# Patient Record
Sex: FEMALE | Race: WHITE | NOT HISPANIC OR LATINO | URBAN - METROPOLITAN AREA
[De-identification: names, ages, dates, MRNs, and addresses within clinical notes are randomized per-mention and may not be internally consistent; named-entity substitution may affect disease eponyms.]

---

## 2018-01-07 LAB — RAPID GROUP A STREP (OHS): NEGATIVE

## 2020-02-25 LAB — RAPID GROUP A STREP (OHS): NEGATIVE

## 2020-10-06 ENCOUNTER — HISTORICAL (OUTPATIENT)
Dept: URGENT CARE | Facility: CLINIC | Age: 65
End: 2020-10-06

## 2020-10-06 ENCOUNTER — HISTORICAL (OUTPATIENT)
Dept: ADMINISTRATIVE | Facility: HOSPITAL | Age: 65
End: 2020-10-06

## 2020-10-06 LAB
ABS NEUT (OLG): 4.35 X10(3)/MCL (ref 2.1–9.2)
BASOPHILS # BLD AUTO: 0 X10(3)/MCL (ref 0–0.2)
BASOPHILS NFR BLD AUTO: 0 %
EOSINOPHIL # BLD AUTO: 0.1 X10(3)/MCL (ref 0–0.9)
EOSINOPHIL NFR BLD AUTO: 1 %
ERYTHROCYTE [DISTWIDTH] IN BLOOD BY AUTOMATED COUNT: 12.4 % (ref 11.5–17)
HCT VFR BLD AUTO: 41.1 % (ref 37–47)
HGB BLD-MCNC: 13.4 GM/DL (ref 12–16)
LYMPHOCYTES # BLD AUTO: 2 X10(3)/MCL (ref 0.6–4.6)
LYMPHOCYTES NFR BLD AUTO: 29 %
MCH RBC QN AUTO: 32.7 PG (ref 27–31)
MCHC RBC AUTO-ENTMCNC: 32.6 GM/DL (ref 33–36)
MCV RBC AUTO: 100.2 FL (ref 80–94)
MONOCYTES # BLD AUTO: 0.4 X10(3)/MCL (ref 0.1–1.3)
MONOCYTES NFR BLD AUTO: 6 %
NEUTROPHILS # BLD AUTO: 4.35 X10(3)/MCL (ref 2.1–9.2)
NEUTROPHILS NFR BLD AUTO: 63 %
PLATELET # BLD AUTO: 336 X10(3)/MCL (ref 130–400)
PMV BLD AUTO: 9.5 FL (ref 9.4–12.4)
RBC # BLD AUTO: 4.1 X10(6)/MCL (ref 4.2–5.4)
URATE SERPL-MCNC: 5 MG/DL (ref 2.7–6)
WBC # SPEC AUTO: 6.9 X10(3)/MCL (ref 4.5–11.5)

## 2022-04-10 ENCOUNTER — HISTORICAL (OUTPATIENT)
Dept: ADMINISTRATIVE | Facility: HOSPITAL | Age: 67
End: 2022-04-10

## 2022-04-29 VITALS
DIASTOLIC BLOOD PRESSURE: 76 MMHG | BODY MASS INDEX: 30.79 KG/M2 | OXYGEN SATURATION: 96 % | WEIGHT: 173.75 LBS | HEIGHT: 63 IN | SYSTOLIC BLOOD PRESSURE: 127 MMHG

## 2022-05-04 NOTE — HISTORICAL [facility]ER
This is a historical note converted from [employer]. Formatting and pictures may have been removed.  Please reference [employer] for original formatting and attached multimedia. Chief Complaint  right knee pain, swelling, hot to touch x 5 days, pt. denieds injury  History of Present Illness  64-year-old female presents to clinic complaining of right knee pain?and swelling that began last night. ?Patient states is also?warm to touch. ?Tried applying icy hot. ?Denies any?fall injury or trauma. ?States with certain movements?she will feel sharp pain in the knee. ?She is able to weight-bear and ambulate. ?Denies any history of gout. ?Denies any history of knee problems.  Review of Systems  Constitutional: negative except as stated in HPI  Eye: negative except as stated in HPI  ENT: negative except as stated in HPI  Respiratory: negative except as stated in HPI  Cardiovascular: negative except as stated in HPI  Gastrointestinal: negative except as stated in HPI  Genitourinary: negative except as stated in HPI  Hema/Lymph: negative except as stated in HPI  Endocrine: negative except as stated in HPI  Immunologic: negative except as stated in HPI  Musculoskeletal: negative except as stated in HPI  Integumentary: negative except as stated in HPI  Neurologic: negative except as stated in HPI  All Other ROS_ negative except as stated in HPI  Physical Exam  Vitals & Measurements  T:?36.9? ?C (Oral)? HR:?84(Peripheral)? RR:?20? BP:?127/76? SpO2:?96%?  HT:?160.00?cm? WT:?78.800?kg? BMI:?30.78?  General_well-developed well-nourished in no acute distress  Musculoskeletal_there is some swelling of the right knee. ?Has full range of motion. ?Negative McMurrays. ?Negative anterior posterior drawer. ?No ligament laxity. ?There is mild warmth to touch. ?No ecchymosis. ?No erythema.  Integumentary_no rashes or skin lesions present  Neurologic_ cranial nerves intact, no signs of peripheral neurological deficit, motor/sensory function  intact  ?  ?  Assessment/Plan  1.?Right knee pain?M25.561  ?Start the anti-inflammatory today. ?Take it with food. ?Do not take any Advil Aleve Motrin or ibuprofen with it?it is okay to continue your baby aspirin and you can take Tylenol if needed. ?Rest ice elevate the affected limb 3-4 times a day for 10 to 15 minutes.? We will call you with the results of your blood work when it becomes available.? We will consider sending you to an orthopedic doctor once we reviewed the results of the blood work.  Ordered:  diclofenac, 75 mg = 1 tab(s), Oral, BID, # 14 tab(s), 0 Refill(s), Pharmacy: [facility]/pharmacy #[id number], 160, cm, Height/Length Dosing, 10/06/20 9:55:00 CDT, 78.8, kg, Weight Dosing, 10/06/20 9:55:00 CDT  CBC w/ Auto Diff, Routine collect, 10/06/20 10:14:00 CDT, Blood, Order for future visit, Stop date 10/06/20 10:14:00 CDT, Lab Collect, No Charge, Right knee pain, 10/06/20 10:14:00 CDT  Office/Outpatient Visit Level 4 Established [id number] PC, Right knee pain, [facility], 10/06/20 10:14:00 CDT  Uric Acid, Routine collect, 10/06/20 10:14:00 CDT, Blood, Order for future visit, Stop date 10/06/20 10:14:00 CDT, Lab Collect, No Charge, Right knee pain, 10/06/20 10:14:00 CDT  XR Knee Right 3 Views, Routine, 10/06/20 10:20:00 CDT, None, Ambulatory, Rad Type, Right knee pain, Not Scheduled, 10/06/20 10:20:00 CDT  ?   Problem List/Past Medical History  Ongoing  Hyperlipidemia  Obesity  TIA (transient ischemic attack)  Historical  No qualifying data  Procedure/Surgical History  Carotid Artery Endarterectomy (Left) (2013)  Endarterectomy of other vessels of head and neck (2013)  Procedure on single vessel (2013)   section  Cholecystectomy  Hemorrhoidectomy  Thromboendarterectomy, including patch graft, if performed; carotid, vertebral, subclavian, by neck incision  Total hysterectomy   Medications  aspirin 81 mg oral Delayed Release (EC) tablet, 162 mg= 2 tab(s), Oral, Daily  Caltrate 600 Plus oral tablet, 1  tab(s), Oral, Daily  Centrum Silver Womens oral tablet, 1 tab(s), Oral, Daily  diclofenac sodium 75 mg oral delayed release tablet, 75 mg= 1 tab(s), Oral, BID  omega-3 polyunsaturated fatty acids ethyl esters 1000 mg oral capsule, 4000 mg= 4 cap(s), Oral, Daily  Repatha SureClick 140 mg/mL subcutaneous solution, 140 mg, Subcutaneous  simvastatin 40 mg oral tablet, 40 mg= 1 tab(s), Oral, Once a day (at bedtime)  Allergies  Crestor?(passed out)  Percocet Tablets?(itching)  Social History  Abuse/Neglect  No, 10/06/2020  Alcohol  Current, Beer, 1-2 times per week, 02/25/2020  Current, Beer, Daily, 3 drinks/episode average., 12/13/2013  Substance Use - Denies Substance Abuse, 12/13/2013  Never, 10/06/2020  Tobacco  10 or more cigarettes (1/2 pack or more)/day in last 30 days, No, 10/06/2020  Family History  CAD (coronary atherosclerotic disease).....: Father.  CVA (cerebral infarction): Father.  Lung cancer: Father.  Health Maintenance  Health Maintenance  ???Pending?(in the next year)  ??? ??OverDue  ??? ? ? ?Aspirin Therapy for CVD Prevention due??12/31/14??and every 1??year(s)  ??? ? ? ?Diabetes Screening due??12/12/16??and every 3??year(s)  ??? ? ? ?Influenza Vaccine due??10/01/19??and every 1??day(s)  ??? ? ? ?Smoking Cessation due??01/01/20??and every 1??year(s)  ??? ? ? ?Alcohol Misuse Screening due??01/02/20??and every 1??year(s)  ??? ??Due?  ??? ? ? ?ADL Screening due??10/06/20??and every 1??year(s)  ??? ? ? ?Breast Cancer Screening due??10/06/20??and every?  ??? ? ? ?Colorectal Screening due??10/06/20??and every?  ??? ? ? ?Depression Screening due??10/06/20??and every?  ??? ? ? ?Lung Cancer Screening due??10/06/20??and every 1??year(s)  ??? ? ? ?Tetanus Vaccine due??10/06/20??and every 10??year(s)  ??? ? ? ?Zoster Vaccine due??10/06/20??and every?  ??? ??Due In Future?  ??? ? ? ?Obesity Screening not due until??01/01/21??and every 1??year(s)  ???Satisfied?(in the past 1 year)  ??? ??Satisfied?  ??? ? ? ?Blood  Pressure Screening on??10/06/20.??Satisfied by ANIKA RAMOS LPN  ??? ? ? ?Body Mass Index Check on??10/06/20.??Satisfied by ANIKA RAMOS LPN  ??? ? ? ?Obesity Screening on??10/06/20.??Satisfied by ANIKA RAMOS LPN  ?  Diagnostic Results  Mild degenerative changes on x-ray

## 2022-09-21 ENCOUNTER — HISTORICAL (OUTPATIENT)
Dept: ADMINISTRATIVE | Facility: HOSPITAL | Age: 67
End: 2022-09-21

## 2023-01-31 ENCOUNTER — TELEPHONE (OUTPATIENT)
Dept: DIABETES | Facility: CLINIC | Age: 68
End: 2023-01-31

## 2023-05-04 ENCOUNTER — TELEPHONE (OUTPATIENT)
Dept: DIABETES | Facility: CLINIC | Age: 68
End: 2023-05-04

## 2023-11-12 ENCOUNTER — PATIENT MESSAGE (OUTPATIENT)
Dept: DIABETES | Facility: CLINIC | Age: 68
End: 2023-11-12
Payer: MEDICARE

## 2024-01-09 ENCOUNTER — OFFICE VISIT (OUTPATIENT)
Dept: URGENT CARE | Facility: CLINIC | Age: 69
End: 2024-01-09
Payer: MEDICARE

## 2024-01-09 VITALS
TEMPERATURE: 98 F | HEIGHT: 63 IN | DIASTOLIC BLOOD PRESSURE: 81 MMHG | WEIGHT: 187 LBS | BODY MASS INDEX: 33.13 KG/M2 | HEART RATE: 85 BPM | RESPIRATION RATE: 17 BRPM | OXYGEN SATURATION: 96 % | SYSTOLIC BLOOD PRESSURE: 149 MMHG

## 2024-01-09 DIAGNOSIS — M54.50 ACUTE LEFT-SIDED LOW BACK PAIN WITHOUT SCIATICA: Primary | ICD-10-CM

## 2024-01-09 PROCEDURE — 96372 THER/PROPH/DIAG INJ SC/IM: CPT | Mod: ,,, | Performed by: FAMILY MEDICINE

## 2024-01-09 PROCEDURE — 99203 OFFICE O/P NEW LOW 30 MIN: CPT | Mod: 25,,, | Performed by: FAMILY MEDICINE

## 2024-01-09 RX ORDER — EVOLOCUMAB 140 MG/ML
140 INJECTION, SOLUTION SUBCUTANEOUS
COMMUNITY
Start: 2024-01-02

## 2024-01-09 RX ORDER — KETOROLAC TROMETHAMINE 30 MG/ML
30 INJECTION, SOLUTION INTRAMUSCULAR; INTRAVENOUS
Status: COMPLETED | OUTPATIENT
Start: 2024-01-09 | End: 2024-01-09

## 2024-01-09 RX ORDER — DICLOFENAC SODIUM 50 MG/1
50 TABLET, DELAYED RELEASE ORAL 2 TIMES DAILY PRN
Qty: 14 TABLET | Refills: 0 | Status: SHIPPED | OUTPATIENT
Start: 2024-01-09 | End: 2024-01-16

## 2024-01-09 RX ORDER — SIMVASTATIN 40 MG/1
40 TABLET, FILM COATED ORAL NIGHTLY
COMMUNITY
Start: 2023-12-20

## 2024-01-09 RX ORDER — CYCLOBENZAPRINE HCL 10 MG
10 TABLET ORAL 3 TIMES DAILY PRN
Qty: 21 TABLET | Refills: 0 | Status: SHIPPED | OUTPATIENT
Start: 2024-01-09 | End: 2024-01-16

## 2024-01-09 RX ADMIN — KETOROLAC TROMETHAMINE 30 MG: 30 INJECTION, SOLUTION INTRAMUSCULAR; INTRAVENOUS at 11:01

## 2024-01-09 NOTE — PROGRESS NOTES
"Subjective:      Patient ID: Chrissy Tesfaye is a 68 y.o. female.    Vitals:  height is 5' 3" (1.6 m) and weight is 84.8 kg (187 lb). Her temperature is 98.3 °F (36.8 °C). Her blood pressure is 149/81 (abnormal) and her pulse is 85. Her respiration is 17 and oxygen saturation is 96%.     Chief Complaint: Back Pain ( Patient is a 68 y.o. female who presents to urgent care with complaints of lower back pain  x since new years pilar . Patient denies trauma, fall, or heavy lifting. )    68-year-old female presents to clinic complaining of a 9 day history of left lower back pain.  Denies any injury trauma or fall.  States she did sleep on the sofa the night before the pain started.  Denies any strenuous activities or heavy lifting prior to this.  Pain is nonradiating.  Denies any weakness numbness tingling in the lower extremities.  Denies any urinary burning frequency or urgency.  Denies any bladder or bowel dysfunction.  Has been using a heating pad.  Has not taking any over-the-counter medications.  No history of low back pain or low back injuries.  Patient states her movements will make the pain worse.  Such as twisting at the waist or getting up from a seated position.        Constitution: Negative.   HENT: Negative.     Cardiovascular: Negative.    Eyes: Negative.    Respiratory: Negative.     Gastrointestinal: Negative.    Genitourinary: Negative.    Musculoskeletal:  Positive for back pain.   Skin: Negative.    Allergic/Immunologic: Negative.    Neurological: Negative.    Hematologic/Lymphatic: Negative.       Objective:     Physical Exam   Constitutional: She is oriented to person, place, and time.  Non-toxic appearance. She does not appear ill. No distress.   HENT:   Head: Normocephalic and atraumatic.   Pulmonary/Chest: Effort normal.   Abdominal: Normal appearance.   Musculoskeletal:         General: No tenderness (spine and lower back exam unremarkable.  No hypertonicity or tenderness on palpation.  " "5/5 strength in the bilateral lower extremities).   Neurological: She is alert and oriented to person, place, and time.   Skin: Skin is not diaphoretic and no rash.   Psychiatric: Her behavior is normal. Mood, judgment and thought content normal.   Vitals reviewed.           Previous History      Review of patient's allergies indicates:   Allergen Reactions    Oxycodone-acetaminophen Itching    Rosuvastatin Other (See Comments)     vertigo       Past Medical History:   Diagnosis Date    Hyperlipidemia      Current Outpatient Medications   Medication Instructions    cyclobenzaprine (FLEXERIL) 10 mg, Oral, 3 times daily PRN    diclofenac (VOLTAREN) 50 mg, Oral, 2 times daily PRN    REPATHA SURECLICK 140 mg, Subcutaneous, Every 14 days    simvastatin (ZOCOR) 40 mg, Oral, Nightly     Past Surgical History:   Procedure Laterality Date     SECTION      CHOLECYSTECTOMY      HYSTERECTOMY      SHOULDER SURGERY       Family History   Problem Relation Age of Onset    Suicide Mother     Lung cancer Father        Social History     Tobacco Use    Smoking status: Former     Current packs/day: 1.00     Average packs/day: 1 pack/day for 40.0 years (40.0 ttl pk-yrs)     Types: Cigarettes     Start date: 1984     Passive exposure: Never    Smokeless tobacco: Never   Substance Use Topics    Alcohol use: Yes     Comment: 2-3 times a  week    Drug use: Never        Physical Exam      Vital Signs Reviewed   BP (!) 149/81   Pulse 85   Temp 98.3 °F (36.8 °C)   Resp 17   Ht 5' 3" (1.6 m)   Wt 84.8 kg (187 lb)   SpO2 96%   BMI 33.13 kg/m²        Procedures    Procedures     Labs     Results for orders placed or performed in visit on 22   POCT rapid strep A   Result Value Ref Range    Rapid Group A Strep Negative        Assessment:     1. Acute left-sided low back pain without sciatica        Plan:   Lumbar spine x-ray showing degenerative changes.  Official report is pending  Medications sent to pharmacy  Muscle " relaxer will cause drowsiness.  Do not drink alcohol or drive when taking it.  Best to take it at bedtime  Start the anti-inflammatory this evening.  Always take it with food.  No heavy lifting or strenuous activities for one-week  Do not take any Advil Aleve Motrin ibuprofen naproxen with the anti-inflammatory  Contact this clinic with any concerns  If symptoms persist or worsen seek medical attention immediately    Acute left-sided low back pain without sciatica  -     XR LUMBAR SPINE 2 OR 3 VIEWS; Future; Expected date: 01/09/2024    Other orders  -     ketorolac injection 30 mg  -     cyclobenzaprine (FLEXERIL) 10 MG tablet; Take 1 tablet (10 mg total) by mouth 3 (three) times daily as needed for Muscle spasms.  Dispense: 21 tablet; Refill: 0  -     diclofenac (VOLTAREN) 50 MG EC tablet; Take 1 tablet (50 mg total) by mouth 2 (two) times daily as needed (pain).  Dispense: 14 tablet; Refill: 0

## 2024-01-09 NOTE — PROGRESS NOTES
"Subjective:      Patient ID: Chrissy Tesfaye is a 68 y.o. female.    Vitals:  height is 5' 3" (1.6 m) and weight is 84.8 kg (187 lb). Her temperature is 98.3 °F (36.8 °C). Her blood pressure is 149/81 (abnormal) and her pulse is 85. Her respiration is 17 and oxygen saturation is 96%.     Chief Complaint: Back Pain ( Patient is a 68 y.o. female who presents to urgent care with complaints of lower back pain  x since new years pilar . Patient denies trauma, fall, or heavy lifting. )     Patient is a 68 y.o. female who presents to urgent care with complaints of lower back pain  x since new years pilar . Patient denies trauma, fall, or heavy lifting.     Back Pain    Musculoskeletal:  Positive for back pain.    Objective:     Physical Exam    Assessment:     No diagnosis found.    Plan:       There are no diagnoses linked to this encounter.                "

## 2024-01-09 NOTE — PATIENT INSTRUCTIONS
Plan:   Lumbar spine x-ray showing degenerative changes.  Official report is pending  Medications sent to pharmacy  Muscle relaxer will cause drowsiness.  Do not drink alcohol or drive when taking it.  Best to take it at bedtime  Start the anti-inflammatory this evening.  Always take it with food.  No heavy lifting or strenuous activities for one-week  Do not take any Advil Aleve Motrin ibuprofen naproxen with the anti-inflammatory  Contact this clinic with any concerns  If symptoms persist or worsen seek medical attention immediately

## 2025-08-23 ENCOUNTER — PATIENT MESSAGE (OUTPATIENT)
Dept: RESEARCH | Facility: HOSPITAL | Age: 70
End: 2025-08-23
Payer: MEDICARE